# Patient Record
Sex: FEMALE | Race: WHITE | HISPANIC OR LATINO
[De-identification: names, ages, dates, MRNs, and addresses within clinical notes are randomized per-mention and may not be internally consistent; named-entity substitution may affect disease eponyms.]

---

## 2021-12-27 PROBLEM — Z00.00 ENCOUNTER FOR PREVENTIVE HEALTH EXAMINATION: Status: ACTIVE | Noted: 2021-12-27

## 2022-01-04 ENCOUNTER — APPOINTMENT (OUTPATIENT)
Dept: ENDOCRINOLOGY | Facility: CLINIC | Age: 62
End: 2022-01-04
Payer: COMMERCIAL

## 2022-01-04 ENCOUNTER — TRANSCRIPTION ENCOUNTER (OUTPATIENT)
Age: 62
End: 2022-01-04

## 2022-01-04 VITALS
WEIGHT: 137 LBS | RESPIRATION RATE: 16 BRPM | SYSTOLIC BLOOD PRESSURE: 120 MMHG | HEART RATE: 72 BPM | TEMPERATURE: 97.7 F | HEIGHT: 61 IN | DIASTOLIC BLOOD PRESSURE: 60 MMHG | BODY MASS INDEX: 25.86 KG/M2 | OXYGEN SATURATION: 98 %

## 2022-01-04 DIAGNOSIS — K21.9 GASTRO-ESOPHAGEAL REFLUX DISEASE W/OUT ESOPHAGITIS: ICD-10-CM

## 2022-01-04 PROCEDURE — 99244 OFF/OP CNSLTJ NEW/EST MOD 40: CPT | Mod: 25

## 2022-01-04 RX ORDER — TIOCONAZOLE 6.5 %
400 OINTMENT WITH PREFILLED APPLICATOR VAGINAL
Refills: 0 | Status: ACTIVE | COMMUNITY

## 2022-01-04 RX ORDER — FLUTICASONE PROPIONATE 50 MCG
50 SPRAY, SUSPENSION NASAL DAILY
Refills: 0 | Status: ACTIVE | COMMUNITY

## 2022-01-04 RX ORDER — ALBUTEROL 90 MCG
90 AEROSOL (GRAM) INHALATION
Refills: 0 | Status: ACTIVE | COMMUNITY

## 2022-01-04 RX ORDER — LACTOBACILLUS ACIDOPHILUS 0.5 MG
TABLET ORAL
Refills: 0 | Status: ACTIVE | COMMUNITY

## 2022-01-04 RX ORDER — ASCORBIC ACID 500 MG
500 TABLET ORAL DAILY
Refills: 0 | Status: ACTIVE | COMMUNITY

## 2022-01-04 RX ORDER — CYCLOSPORINE 0 G/ML
0.09 SOLUTION/ DROPS OPHTHALMIC; TOPICAL
Refills: 0 | Status: ACTIVE | COMMUNITY

## 2022-01-04 NOTE — CONSULT LETTER
[Dear  ___] : Dear  [unfilled], [Sincerely,] : Sincerely, [FreeTextEntry1] : Thank you for referring  Ms. Amanda Bourne to me for evaluation and treatment. Please, see attached consultation note. As always, if there are specific questions you would like to discuss, please feel free to contact me.\par Thank you for the courtesy of this evaluation.\par  [FreeTextEntry3] : Ligia Hernandez MD, FACE, ECNU\par

## 2022-01-04 NOTE — HISTORY OF PRESENT ILLNESS
[FreeTextEntry1] : 61 year female  referred for hypothyroidism  and thyroid cancer management. 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 She underwent a partial thyroidectomy, followed by a completion thyroidectomy and remnant POTTER ablation with 100 mci of I-131 in 1999. She believes that the cancer was contained. She's presently on Synthroid 88 mcg 5.5/ week and Cytomel 5 mcg qd (has been on for a long time). previously under care of Dr. Birch.\par Labs from 7/30/21- TSH- 0.16 <-- 0.03\par Tg- 0.1, Tg ab < 1\par Thyroid US (3/12/20)- no suspicious findings \par Thyroid US (1/19/18)- no suspicious findings \par Denies family history of thyroid cancer.\par She's been diagnosed with osteopenia in 2007, on Fosamax initially (with a GI distress), then switched to Boniva and stayed on it for 2 years.\par DXA (10/5/21)- LS (-1.1), FN (-1.4). FRAX- 8.2% and 0.7%\par DXA (8/6/19)- LS (-0.6), FN (-1.2)\par

## 2022-01-04 NOTE — ASSESSMENT
[FreeTextEntry1] : 1. FVPTC, s/p total thyroidectomy and remnant POTTER therapy\par - obtain full pathology report, as well as all info on WBS/POTTER therapy\par - at present, I do not think she requires a strict TSH suppression\par - we discussed a possible stopping of cytomel and readjusting her synthroid dose post labs\par - check thyroid panel, Tg/Tg antibodies\par - repeat thyroid bed US at [facility]\par \par 2. Osteopenia, still with a relatively low FRAX score\par - calcium 500 mg bid, add extra OTC vitamin D3 2,000 IU/day\par - continue weight-bearing exercises; advised avoiding high risk sports\par - DXA 3 sites in 10/23\par \par RTC 3-4 months\par

## 2022-01-07 LAB
25(OH)D3 SERPL-MCNC: 37.3 NG/ML
ALBUMIN SERPL ELPH-MCNC: 4.7 G/DL
ALP BLD-CCNC: 69 U/L
ALT SERPL-CCNC: 14 U/L
ANION GAP SERPL CALC-SCNC: 13 MMOL/L
AST SERPL-CCNC: 18 U/L
BILIRUB SERPL-MCNC: 0.7 MG/DL
BUN SERPL-MCNC: 18 MG/DL
CALCIUM SERPL-MCNC: 10.1 MG/DL
CHLORIDE SERPL-SCNC: 107 MMOL/L
CO2 SERPL-SCNC: 25 MMOL/L
CREAT SERPL-MCNC: 0.74 MG/DL
GLUCOSE SERPL-MCNC: 97 MG/DL
POTASSIUM SERPL-SCNC: 5 MMOL/L
PROT SERPL-MCNC: 6.6 G/DL
SODIUM SERPL-SCNC: 145 MMOL/L
T3 SERPL-MCNC: 125 NG/DL
T4 FREE SERPL-MCNC: 1.4 NG/DL
THYROGLOB AB SERPL-ACNC: <20 IU/ML
THYROGLOB SERPL-MCNC: <0.2 NG/ML
TSH SERPL-ACNC: 0.12 UIU/ML

## 2022-03-15 ENCOUNTER — RESULT REVIEW (OUTPATIENT)
Age: 62
End: 2022-03-15

## 2022-04-22 ENCOUNTER — APPOINTMENT (OUTPATIENT)
Dept: ENDOCRINOLOGY | Facility: CLINIC | Age: 62
End: 2022-04-22
Payer: COMMERCIAL

## 2022-04-22 VITALS
HEART RATE: 81 BPM | TEMPERATURE: 98.1 F | SYSTOLIC BLOOD PRESSURE: 127 MMHG | DIASTOLIC BLOOD PRESSURE: 78 MMHG | BODY MASS INDEX: 26.06 KG/M2 | HEIGHT: 61 IN | WEIGHT: 138 LBS | OXYGEN SATURATION: 98 %

## 2022-04-22 PROCEDURE — 99214 OFFICE O/P EST MOD 30 MIN: CPT | Mod: 25

## 2022-04-22 NOTE — HISTORY OF PRESENT ILLNESS
[FreeTextEntry1] : 62 year female  f/u for hypothyroidism  and thyroid cancer management. \par \par *** Apr 22, 2022 ***\par \par feels well on Synthroid 88 mcg 6/week. 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 She underwent a partial thyroidectomy, followed by a completion thyroidectomy and remnant POTTER ablation with 100 mci of I-131 in 1999. She believes that the cancer was contained. She's presently on Synthroid 88 mcg 5.5/ week and Cytomel 5 mcg qd (has been on for a long time). previously under care of Dr. Birch.\par Labs from 7/30/21- TSH- 0.16 <-- 0.03\par Tg- 0.1, Tg ab < 1\par Thyroid US (3/12/20)- no suspicious findings \par Thyroid US (1/19/18)- no suspicious findings \par Denies family history of thyroid cancer.\par She's been diagnosed with osteopenia in 2007, on Fosamax initially (with a GI distress), then switched to Boniva and stayed on it for 2 years.\par DXA (10/5/21)- LS (-1.1), FN (-1.4). FRAX- 8.2% and 0.7%\par DXA (8/6/19)- LS (-0.6), FN (-1.2)\par

## 2022-04-22 NOTE — ASSESSMENT
[FreeTextEntry1] : 1. FVPTC, s/p total thyroidectomy and remnant POTTER therapy\par - at present, I do not think she requires a strict TSH suppression\par - will keep off cytomel , continue Synthroid 88 mcg 6/week for now, pending results\par - monitor thyroid panel, Tg/Tg antibodies\par - repeat thyroid bed US at [facility] this month\par \par 2. Osteopenia, still with a relatively low FRAX score\par - calcium 500 mg bid, add extra OTC vitamin D3 2,000 IU/day\par - continue weight-bearing exercises; advised avoiding high risk sports\par - DXA 3 sites in 10/23\par \par RTC 3-6 months\par

## 2022-04-24 ENCOUNTER — TRANSCRIPTION ENCOUNTER (OUTPATIENT)
Age: 62
End: 2022-04-24

## 2022-04-24 LAB
T3FREE SERPL-MCNC: 2.63 PG/ML
T4 FREE SERPL-MCNC: 1.5 NG/DL
TSH SERPL-ACNC: 0.32 UIU/ML

## 2022-04-28 ENCOUNTER — TRANSCRIPTION ENCOUNTER (OUTPATIENT)
Age: 62
End: 2022-04-28

## 2022-05-16 ENCOUNTER — APPOINTMENT (OUTPATIENT)
Dept: ENDOCRINOLOGY | Facility: CLINIC | Age: 62
End: 2022-05-16

## 2022-10-24 ENCOUNTER — APPOINTMENT (OUTPATIENT)
Dept: ENDOCRINOLOGY | Facility: CLINIC | Age: 62
End: 2022-10-24

## 2022-10-24 VITALS
HEART RATE: 68 BPM | DIASTOLIC BLOOD PRESSURE: 66 MMHG | HEIGHT: 61 IN | TEMPERATURE: 98 F | BODY MASS INDEX: 25.3 KG/M2 | SYSTOLIC BLOOD PRESSURE: 118 MMHG | RESPIRATION RATE: 16 BRPM | WEIGHT: 134 LBS | OXYGEN SATURATION: 98 %

## 2022-10-24 PROCEDURE — 36415 COLL VENOUS BLD VENIPUNCTURE: CPT

## 2022-10-24 PROCEDURE — 99214 OFFICE O/P EST MOD 30 MIN: CPT | Mod: 25

## 2022-10-24 RX ORDER — LIOTHYRONINE SODIUM 5 UG/1
5 TABLET ORAL DAILY
Refills: 0 | Status: DISCONTINUED | COMMUNITY
End: 2022-10-24

## 2022-10-24 RX ORDER — GABAPENTIN 100 MG/1
100 CAPSULE ORAL 4 TIMES DAILY
Refills: 0 | Status: DISCONTINUED | COMMUNITY
End: 2022-10-24

## 2022-10-24 RX ORDER — TRAMADOL HYDROCHLORIDE 50 MG/1
50 TABLET, COATED ORAL
Qty: 28 | Refills: 0 | Status: DISCONTINUED | COMMUNITY
End: 2022-10-24

## 2022-10-24 NOTE — HISTORY OF PRESENT ILLNESS
[FreeTextEntry1] : 62 year female  f/u for hypothyroidism  and thyroid cancer management. \par \par *** Oct 24, 2022 ***\par \par feels well on Synthroid 88 mcg 6/week.\par denies new c/o, no palpitations\par Thyr US (4/25/22)- s/p total tx, no BRAYDEN\par \par *** Apr 22, 2022 ***\par \par feels well on Synthroid 88 mcg 6/week. 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 She underwent a partial thyroidectomy, followed by a completion thyroidectomy and remnant POTTER ablation with 100 mci of I-131 in 1999. She believes that the cancer was contained. She's presently on Synthroid 88 mcg 5.5/ week and Cytomel 5 mcg qd (has been on for a long time). previously under care of Dr. Birch.\par Labs from 7/30/21- TSH- 0.16 <-- 0.03\par Tg- 0.1, Tg ab < 1\par Thyroid US (3/12/20)- no suspicious findings \par Thyroid US (1/19/18)- no suspicious findings \par Denies family history of thyroid cancer.\par She's been diagnosed with osteopenia in 2007, on Fosamax initially (with a GI distress), then switched to Boniva and stayed on it for 2 years.\par DXA (10/5/21)- LS (-1.1), FN (-1.4). FRAX- 8.2% and 0.7%\par DXA (8/6/19)- LS (-0.6), FN (-1.2)\par

## 2022-10-24 NOTE — ASSESSMENT
[FreeTextEntry1] : 1. FVPTC, s/p total thyroidectomy and remnant POTTER therapy\par - at present, I do not think she requires a strict TSH suppression\par - will keep off cytomel , continue Synthroid 88 mcg 6/week for now, pending results\par - monitor thyroid panel, Tg/Tg antibodies\par - repeat thyroid bed US at [facility] in 4/25 (3 years)\par \par 2. Osteopenia, still with a relatively low FRAX score\par - calcium 500 mg bid, add extra OTC vitamin D3 2,000 IU/day\par - continue weight-bearing exercises; advised avoiding high risk sports\par - DXA 3 sites in 10/23\par \par RTC 6-12 months\par

## 2022-10-26 ENCOUNTER — TRANSCRIPTION ENCOUNTER (OUTPATIENT)
Age: 62
End: 2022-10-26

## 2022-10-26 LAB
25(OH)D3 SERPL-MCNC: 39 NG/ML
ALBUMIN SERPL ELPH-MCNC: 4.8 G/DL
ALP BLD-CCNC: 85 U/L
ALT SERPL-CCNC: 17 U/L
ANION GAP SERPL CALC-SCNC: 10 MMOL/L
AST SERPL-CCNC: 20 U/L
BILIRUB SERPL-MCNC: 0.7 MG/DL
BUN SERPL-MCNC: 20 MG/DL
CALCIUM SERPL-MCNC: 10.2 MG/DL
CHLORIDE SERPL-SCNC: 103 MMOL/L
CO2 SERPL-SCNC: 26 MMOL/L
CREAT SERPL-MCNC: 0.76 MG/DL
EGFR: 88 ML/MIN/1.73M2
GLUCOSE SERPL-MCNC: 92 MG/DL
POTASSIUM SERPL-SCNC: 5.4 MMOL/L
PROT SERPL-MCNC: 6.9 G/DL
SODIUM SERPL-SCNC: 139 MMOL/L
T4 FREE SERPL-MCNC: 1.5 NG/DL
THYROGLOB AB SERPL-ACNC: <20 IU/ML
THYROGLOB SERPL-MCNC: <0.2 NG/ML
TSH SERPL-ACNC: 0.5 UIU/ML

## 2022-11-08 ENCOUNTER — TRANSCRIPTION ENCOUNTER (OUTPATIENT)
Age: 62
End: 2022-11-08

## 2022-11-09 ENCOUNTER — TRANSCRIPTION ENCOUNTER (OUTPATIENT)
Age: 62
End: 2022-11-09

## 2023-03-08 ENCOUNTER — APPOINTMENT (OUTPATIENT)
Dept: ENDOCRINOLOGY | Facility: CLINIC | Age: 63
End: 2023-03-08
Payer: COMMERCIAL

## 2023-03-08 VITALS
OXYGEN SATURATION: 98 % | BODY MASS INDEX: 26.06 KG/M2 | SYSTOLIC BLOOD PRESSURE: 114 MMHG | HEIGHT: 61 IN | TEMPERATURE: 98 F | WEIGHT: 138 LBS | RESPIRATION RATE: 16 BRPM | HEART RATE: 72 BPM | DIASTOLIC BLOOD PRESSURE: 62 MMHG

## 2023-03-08 PROCEDURE — 99214 OFFICE O/P EST MOD 30 MIN: CPT | Mod: 25

## 2023-03-08 NOTE — HISTORY OF PRESENT ILLNESS
[FreeTextEntry1] : 62 year female  f/u for hypothyroidism  and thyroid cancer management. \par \par *** Mar 08, 2023 ***\par \par had covid in 12/22, still struggles with a post-covid fatigue\par was taking biotin for her nails, stopped a week ago for today's blood work\par on Synthroid 88 mcg 6/week.\par no recent labs\par \par *** Oct 24, 2022 ***\par \par feels well on Synthroid 88 mcg 6/week.\par denies new c/o, no palpitations\par Thyr US (4/25/22)- s/p total tx, no BRAYDEN\par \par *** Apr 22, 2022 ***\par \par feels well on Synthroid 88 mcg 6/week. 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 She underwent a partial thyroidectomy, followed by a completion thyroidectomy and remnant POTTER ablation with 100 mci of I-131 in 1999. She believes that the cancer was contained. She's presently on Synthroid 88 mcg 5.5/ week and Cytomel 5 mcg qd (has been on for a long time). previously under care of Dr. Birch.\par Labs from 7/30/21- TSH- 0.16 <-- 0.03\par Tg- 0.1, Tg ab < 1\par Thyroid US (3/12/20)- no suspicious findings \par Thyroid US (1/19/18)- no suspicious findings \par Denies family history of thyroid cancer.\par She's been diagnosed with osteopenia in 2007, on Fosamax initially (with a GI distress), then switched to Boniva and stayed on it for 2 years.\par DXA (10/5/21)- LS (-1.1), FN (-1.4). FRAX- 8.2% and 0.7%\par DXA (8/6/19)- LS (-0.6), FN (-1.2)\par

## 2023-03-08 NOTE — ASSESSMENT
[FreeTextEntry1] : 1. FVPTC, s/p total thyroidectomy and remnant POTTER therapy\par - at present, I do not think she requires a strict TSH suppression\par - will keep off cytomel , continue Synthroid 88 mcg 6/week for now, pending results\par - monitor thyroid panel, Tg/Tg antibodies\par - repeat thyroid bed US at [facility] in 4/25 (3 years)\par \par 2. Osteopenia, still with a relatively low FRAX score\par - calcium 500 mg bid, add extra OTC vitamin D3 2,000 IU/day\par - continue weight-bearing exercises; advised avoiding high risk sports\par - DXA 3 sites in 10/23\par \par RTC 6-12 months\par

## 2023-03-10 ENCOUNTER — TRANSCRIPTION ENCOUNTER (OUTPATIENT)
Age: 63
End: 2023-03-10

## 2023-03-10 LAB
25(OH)D3 SERPL-MCNC: 45.3 NG/ML
ALBUMIN SERPL ELPH-MCNC: 4.9 G/DL
ALP BLD-CCNC: 70 U/L
ALT SERPL-CCNC: 17 U/L
ANION GAP SERPL CALC-SCNC: 19 MMOL/L
AST SERPL-CCNC: 18 U/L
BILIRUB SERPL-MCNC: 0.4 MG/DL
BUN SERPL-MCNC: 23 MG/DL
CALCIUM SERPL-MCNC: 10.7 MG/DL
CHLORIDE SERPL-SCNC: 101 MMOL/L
CO2 SERPL-SCNC: 20 MMOL/L
CREAT SERPL-MCNC: 0.82 MG/DL
EGFR: 80 ML/MIN/1.73M2
GLUCOSE SERPL-MCNC: 93 MG/DL
POTASSIUM SERPL-SCNC: 5.1 MMOL/L
POTASSIUM SERPL-SCNC: 5.4 MMOL/L
PROT SERPL-MCNC: 7.2 G/DL
SODIUM SERPL-SCNC: 140 MMOL/L
T3FREE SERPL-MCNC: 2.96 PG/ML
T4 FREE SERPL-MCNC: 1.4 NG/DL
THYROGLOB AB SERPL-ACNC: <20 IU/ML
THYROGLOB SERPL-MCNC: <0.2 NG/ML
TSH SERPL-ACNC: 0.35 UIU/ML

## 2023-03-13 ENCOUNTER — TRANSCRIPTION ENCOUNTER (OUTPATIENT)
Age: 63
End: 2023-03-13

## 2023-03-22 ENCOUNTER — TRANSCRIPTION ENCOUNTER (OUTPATIENT)
Age: 63
End: 2023-03-22

## 2023-03-24 ENCOUNTER — TRANSCRIPTION ENCOUNTER (OUTPATIENT)
Age: 63
End: 2023-03-24

## 2023-08-23 ENCOUNTER — RX RENEWAL (OUTPATIENT)
Age: 63
End: 2023-08-23

## 2023-08-23 RX ORDER — LEVOTHYROXINE SODIUM 88 UG/1
88 TABLET ORAL DAILY
Qty: 90 | Refills: 3 | Status: ACTIVE | COMMUNITY
Start: 1900-01-01 | End: 1900-01-01

## 2023-11-06 ENCOUNTER — APPOINTMENT (OUTPATIENT)
Dept: ENDOCRINOLOGY | Facility: CLINIC | Age: 63
End: 2023-11-06
Payer: COMMERCIAL

## 2023-11-06 VITALS
BODY MASS INDEX: 26.71 KG/M2 | TEMPERATURE: 98.7 F | RESPIRATION RATE: 16 BRPM | SYSTOLIC BLOOD PRESSURE: 120 MMHG | DIASTOLIC BLOOD PRESSURE: 73 MMHG | WEIGHT: 141.44 LBS | OXYGEN SATURATION: 99 % | HEART RATE: 67 BPM | HEIGHT: 61 IN

## 2023-11-06 DIAGNOSIS — M85.80 OTHER SPECIFIED DISORDERS OF BONE DENSITY AND STRUCTURE, UNSPECIFIED SITE: ICD-10-CM

## 2023-11-06 PROCEDURE — 99214 OFFICE O/P EST MOD 30 MIN: CPT | Mod: 25

## 2023-11-06 PROCEDURE — 36415 COLL VENOUS BLD VENIPUNCTURE: CPT

## 2023-11-09 ENCOUNTER — TRANSCRIPTION ENCOUNTER (OUTPATIENT)
Age: 63
End: 2023-11-09

## 2023-11-09 LAB
25(OH)D3 SERPL-MCNC: 34.9 NG/ML
ALBUMIN SERPL ELPH-MCNC: 4.7 G/DL
ALP BLD-CCNC: 84 U/L
ALT SERPL-CCNC: 15 U/L
ANION GAP SERPL CALC-SCNC: 12 MMOL/L
AST SERPL-CCNC: 20 U/L
BASOPHILS # BLD AUTO: 0.07 K/UL
BASOPHILS NFR BLD AUTO: 0.9 %
BILIRUB SERPL-MCNC: 0.5 MG/DL
BUN SERPL-MCNC: 21 MG/DL
CALCIUM SERPL-MCNC: 9.6 MG/DL
CHLORIDE SERPL-SCNC: 105 MMOL/L
CO2 SERPL-SCNC: 23 MMOL/L
CREAT SERPL-MCNC: 0.62 MG/DL
EGFR: 99 ML/MIN/1.73M2
EOSINOPHIL # BLD AUTO: 0.33 K/UL
EOSINOPHIL NFR BLD AUTO: 4.2 %
ESTIMATED AVERAGE GLUCOSE: 97 MG/DL
GLUCOSE SERPL-MCNC: 84 MG/DL
HBA1C MFR BLD HPLC: 5 %
HCT VFR BLD CALC: 43.3 %
HGB BLD-MCNC: 14.3 G/DL
IMM GRANULOCYTES NFR BLD AUTO: 0.3 %
LYMPHOCYTES # BLD AUTO: 1.87 K/UL
LYMPHOCYTES NFR BLD AUTO: 23.7 %
MAN DIFF?: NORMAL
MCHC RBC-ENTMCNC: 30.4 PG
MCHC RBC-ENTMCNC: 33 GM/DL
MCV RBC AUTO: 92.1 FL
MONOCYTES # BLD AUTO: 0.45 K/UL
MONOCYTES NFR BLD AUTO: 5.7 %
NEUTROPHILS # BLD AUTO: 5.16 K/UL
NEUTROPHILS NFR BLD AUTO: 65.2 %
PLATELET # BLD AUTO: 240 K/UL
POTASSIUM SERPL-SCNC: 4.5 MMOL/L
PROT SERPL-MCNC: 6.6 G/DL
RBC # BLD: 4.7 M/UL
RBC # FLD: 13.9 %
SODIUM SERPL-SCNC: 140 MMOL/L
T3FREE SERPL-MCNC: 2.46 PG/ML
T4 FREE SERPL-MCNC: 1.6 NG/DL
THYROGLOB AB SERPL-ACNC: <1 IU/ML
THYROGLOB SERPL-MCNC: 0.1 NG/ML
TSH SERPL-ACNC: 0.52 UIU/ML
WBC # FLD AUTO: 7.9 K/UL

## 2024-04-12 ENCOUNTER — OUTPATIENT (OUTPATIENT)
Dept: OUTPATIENT SERVICES | Facility: HOSPITAL | Age: 64
LOS: 1 days | End: 2024-04-12
Payer: MEDICARE

## 2024-04-12 ENCOUNTER — TRANSCRIPTION ENCOUNTER (OUTPATIENT)
Age: 64
End: 2024-04-12

## 2024-04-12 ENCOUNTER — APPOINTMENT (OUTPATIENT)
Dept: RADIOLOGY | Facility: CLINIC | Age: 64
End: 2024-04-12
Payer: MEDICARE

## 2024-04-12 DIAGNOSIS — E89.0 POSTPROCEDURAL HYPOTHYROIDISM: ICD-10-CM

## 2024-04-12 PROCEDURE — 77080 DXA BONE DENSITY AXIAL: CPT

## 2024-04-12 PROCEDURE — 77080 DXA BONE DENSITY AXIAL: CPT | Mod: 26

## 2024-04-16 ENCOUNTER — APPOINTMENT (OUTPATIENT)
Dept: ENDOCRINOLOGY | Facility: CLINIC | Age: 64
End: 2024-04-16
Payer: MEDICARE

## 2024-04-16 VITALS
HEART RATE: 72 BPM | TEMPERATURE: 97.6 F | RESPIRATION RATE: 16 BRPM | SYSTOLIC BLOOD PRESSURE: 118 MMHG | HEIGHT: 61 IN | DIASTOLIC BLOOD PRESSURE: 70 MMHG | BODY MASS INDEX: 26.43 KG/M2 | OXYGEN SATURATION: 99 % | WEIGHT: 140 LBS

## 2024-04-16 DIAGNOSIS — C73 MALIGNANT NEOPLASM OF THYROID GLAND: ICD-10-CM

## 2024-04-16 DIAGNOSIS — E87.5 HYPERKALEMIA: ICD-10-CM

## 2024-04-16 DIAGNOSIS — E89.0 POSTPROCEDURAL HYPOTHYROIDISM: ICD-10-CM

## 2024-04-16 PROCEDURE — 99214 OFFICE O/P EST MOD 30 MIN: CPT

## 2024-04-16 PROCEDURE — 36415 COLL VENOUS BLD VENIPUNCTURE: CPT

## 2024-04-16 PROCEDURE — G2211 COMPLEX E/M VISIT ADD ON: CPT

## 2024-04-16 NOTE — ASSESSMENT
[FreeTextEntry1] : 1. FVPTC, s/p total thyroidectomy and remnant POTTER therapy - at present, I do not think she requires a strict TSH suppression - will keep off cytomel , continue Synthroid 88 mcg 6/week for now, pending results - monitor thyroid panel, Tg/Tg antibodies - repeat thyroid bed US at [facility] in 4/25 (3 years)  2. Siet specific osteoporosis I advised the patient on antiresorptive therapy, and reviewed potential options for osteoporosis treatment, including oral and IV bisphosphonates, Prolia, Evenity and rh-PTH therapy. R+B of each options were discussed in detail - calcium 500 mg bid, extra OTC vitamin D3 2,000 IU/day - labs today- will screen for secondary causes - weight-bearing exercises; advised avoiding high risk sports - dental evaluation advised. - potentially will plan for anabolic tx (possibly Evenity), followed by a maintenance tx with b/phos or prolia. Patient will also research, GEMMA carrolliven

## 2024-04-16 NOTE — HISTORY OF PRESENT ILLNESS
[FreeTextEntry1] : 64 year female  f/u for hypothyroidism  and thyroid cancer management.   *** Apr 16, 2024 ***  returned earlier for DXA reviewed no new c/o staying on Synthroid 88 mcg 6/week, calcium 600 mg qd, OTC vitamin D no falls/ fractures  DXA(4/12/24) - LS (-1.0), FN (-1.9), radius (-3.0) DXA (10/5/21)- LS (-1.1), FN (-1.4). FRAX- 8.2% and 0.7% DXA (8/6/19)- LS (-0.6), FN (-1.2)  *** Nov 06, 2023 ***  doing well overall, except for chronic troubles with sleeping on Synthroid 88 mcg 6/week.   *** Mar 08, 2023 ***  had covid in 12/22, still struggles with a post-covid fatigue was taking biotin for her nails, stopped a week ago for today's blood work on Synthroid 88 mcg 6/week. no recent labs  *** Oct 24, 2022 ***  feels well on Synthroid 88 mcg 6/week. denies new c/o, no palpitations Thyr US (4/25/22)- s/p total tx, no BRAYDEN  *** Apr 22, 2022 ***  feels well on Synthroid 88 mcg 6/week. Off cytomel Patient brought more old records. 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 Left completion thyroidectomy (1/6/1999)-pathology-no evidence of malignancy Thyrogen stimulated whole-body scan (5/25/2001 stimulated thyroglobulin less than 1.0, thyroglobulin antibodies-negative.   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 She underwent a partial thyroidectomy, followed by a completion thyroidectomy and remnant POTTER ablation with 100 mci of I-131 in 1999. She believes that the cancer was contained. She's presently on Synthroid 88 mcg 5.5/ week and Cytomel 5 mcg qd (has been on for a long time). previously under care of Dr. Birch. Labs from 7/30/21- TSH- 0.16 <-- 0.03 Tg- 0.1, Tg ab < 1 Thyroid US (3/12/20)- no suspicious findings  Thyroid US (1/19/18)- no suspicious findings  Denies family history of thyroid cancer. She's been diagnosed with osteopenia in 2007, on Fosamax initially (with a GI distress), then switched to Boniva and stayed on it for 2 years. DXA (10/5/21)- LS (-1.1), FN (-1.4). FRAX- 8.2% and 0.7% DXA (8/6/19)- LS (-0.6), FN (-1.2)

## 2024-04-19 ENCOUNTER — TRANSCRIPTION ENCOUNTER (OUTPATIENT)
Age: 64
End: 2024-04-19

## 2024-04-19 LAB
25(OH)D3 SERPL-MCNC: 40.7 NG/ML
ALBUMIN MFR SERPL ELPH: 64.6 %
ALBUMIN SERPL ELPH-MCNC: 4.7 G/DL
ALBUMIN SERPL-MCNC: 4.3 G/DL
ALBUMIN/GLOB SERPL: 1.9 RATIO
ALP BLD-CCNC: 89 U/L
ALP BONE SERPL-MCNC: 17.7 UG/L
ALPHA1 GLOB MFR SERPL ELPH: 3.9 %
ALPHA1 GLOB SERPL ELPH-MCNC: 0.3 G/DL
ALPHA2 GLOB MFR SERPL ELPH: 11.5 %
ALPHA2 GLOB SERPL ELPH-MCNC: 0.8 G/DL
ALT SERPL-CCNC: 16 U/L
ANION GAP SERPL CALC-SCNC: 10 MMOL/L
AST SERPL-CCNC: 20 U/L
B-GLOBULIN MFR SERPL ELPH: 9.9 %
B-GLOBULIN SERPL ELPH-MCNC: 0.7 G/DL
BILIRUB SERPL-MCNC: 0.4 MG/DL
BUN SERPL-MCNC: 23 MG/DL
CALCIUM SERPL-MCNC: 10 MG/DL
CALCIUM SERPL-MCNC: 10 MG/DL
CHLORIDE SERPL-SCNC: 106 MMOL/L
CO2 SERPL-SCNC: 27 MMOL/L
CREAT SERPL-MCNC: 0.75 MG/DL
DEPRECATED KAPPA LC FREE/LAMBDA SER: 1.31 RATIO
EGFR: 88 ML/MIN/1.73M2
GAMMA GLOB FLD ELPH-MCNC: 0.7 G/DL
GAMMA GLOB MFR SERPL ELPH: 10.1 %
GLUCOSE SERPL-MCNC: 90 MG/DL
INTERPRETATION SERPL IEP-IMP: NORMAL
KAPPA LC CSF-MCNC: 1.12 MG/DL
KAPPA LC SERPL-MCNC: 1.47 MG/DL
M PROTEIN SPEC IFE-MCNC: NORMAL
OSTEOCALCIN SERPL-MCNC: 16.1 NG/ML
PARATHYROID HORMONE INTACT: 31 PG/ML
PHOSPHATE SERPL-MCNC: 4.2 MG/DL
POTASSIUM SERPL-SCNC: 5.4 MMOL/L
PROT SERPL-MCNC: 6.6 G/DL
SODIUM SERPL-SCNC: 142 MMOL/L
T3 SERPL-MCNC: 71 NG/DL
T4 FREE SERPL-MCNC: 1.3 NG/DL
TSH SERPL-ACNC: 0.79 UIU/ML

## 2024-04-21 LAB
THYROGLOB AB SERPL-ACNC: <1 IU/ML
THYROGLOB SERPL-MCNC: <0.1 NG/ML

## 2024-04-30 ENCOUNTER — TRANSCRIPTION ENCOUNTER (OUTPATIENT)
Age: 64
End: 2024-04-30

## 2024-05-20 ENCOUNTER — APPOINTMENT (OUTPATIENT)
Dept: ENDOCRINOLOGY | Facility: CLINIC | Age: 64
End: 2024-05-20
Payer: MEDICARE

## 2024-05-20 VITALS
RESPIRATION RATE: 16 BRPM | BODY MASS INDEX: 26.81 KG/M2 | OXYGEN SATURATION: 96 % | SYSTOLIC BLOOD PRESSURE: 122 MMHG | HEART RATE: 61 BPM | WEIGHT: 142 LBS | DIASTOLIC BLOOD PRESSURE: 70 MMHG | HEIGHT: 61 IN

## 2024-05-20 PROCEDURE — 99212 OFFICE O/P EST SF 10 MIN: CPT | Mod: 25

## 2024-05-20 PROCEDURE — 96372 THER/PROPH/DIAG INJ SC/IM: CPT

## 2024-05-20 RX ORDER — ROMOSOZUMAB-AQQG 105 MG/1.17ML
105 INJECTION, SOLUTION SUBCUTANEOUS
Refills: 0 | Status: COMPLETED | OUTPATIENT
Start: 2024-05-20

## 2024-05-20 RX ADMIN — ROMOSOZUMAB-AQQG 0 MG/1.17ML: 105 INJECTION, SOLUTION SUBCUTANEOUS at 00:00

## 2024-05-20 NOTE — ASSESSMENT
[FreeTextEntry1] : 1. FVPTC, s/p total thyroidectomy and remnant POTTER therapy - at present, I do not think she requires a strict TSH suppression - will keep off cytomel , continue Synthroid 88 mcg 6/week for now, pending results - monitor thyroid panel, Tg/Tg antibodies - repeat thyroid bed US at [facility] in 4/25 (3 years)  2. Siet specific osteoporosis I advised the patient on antiresorptive therapy, and reviewed potential options for osteoporosis treatment, including oral and IV bisphosphonates, Prolia, Evenity and rh-PTH therapy. R+B of each options were discussed in detail - calcium 500 mg bid, extra OTC vitamin D3 2,000 IU/day - labs today- will screen for secondary causes - weight-bearing exercises; advised avoiding high risk sports - dental evaluation advised. - Evenity # 1 today, followed by a maintenance tx with b/phos or prolia.  RTC 1 month for Evenity injection

## 2024-05-20 NOTE — REASON FOR VISIT
[Hypothyroidism] : hypothyroidism [Thyroid Cancer] : thyroid cancer [Spouse] : spouse [Osteoporosis] : osteoporosis

## 2024-05-20 NOTE — HISTORY OF PRESENT ILLNESS
[FreeTextEntry1] : 64 year female  f/u for hypothyroidism  and thyroid cancer management.   *** May 20, 2024 ***  Patient returned for Evenity injection. Once again, risks and benefits were reviewed in details. REMS provided.  *** Prolia 60 mg/ml Lot # [id number] Exp. 06/30/26   *** Apr 16, 2024 ***  returned earlier for DXA reviewed no new c/o staying on Synthroid 88 mcg 6/week, calcium 600 mg qd, OTC vitamin D no falls/ fractures  DXA(4/12/24) - LS (-1.0), FN (-1.9), radius (-3.0) DXA (10/5/21)- LS (-1.1), FN (-1.4). FRAX- 8.2% and 0.7% DXA (8/6/19)- LS (-0.6), FN (-1.2)  *** Nov 06, 2023 ***  doing well overall, except for chronic troubles with sleeping on Synthroid 88 mcg 6/week.   *** Mar 08, 2023 ***  had covid in 12/22, still struggles with a post-covid fatigue was taking biotin for her nails, stopped a week ago for today's blood work on Synthroid 88 mcg 6/week. no recent labs  *** Oct 24, 2022 ***  feels well on Synthroid 88 mcg 6/week. denies new c/o, no palpitations Thyr US (4/25/22)- s/p total tx, no BRAYDEN  *** Apr 22, 2022 ***  feels well on Synthroid 88 mcg 6/week. Off cytomel Patient brought more old records. 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 Left completion thyroidectomy (1/6/1999)-pathology-no evidence of malignancy Thyrogen stimulated whole-body scan (5/25/2001 stimulated thyroglobulin less than 1.0, thyroglobulin antibodies-negative.   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 She underwent a partial thyroidectomy, followed by a completion thyroidectomy and remnant POTTER ablation with 100 mci of I-131 in 1999. She believes that the cancer was contained. She's presently on Synthroid 88 mcg 5.5/ week and Cytomel 5 mcg qd (has been on for a long time). previously under care of Dr. Birch. Labs from 7/30/21- TSH- 0.16 <-- 0.03 Tg- 0.1, Tg ab < 1 Thyroid US (3/12/20)- no suspicious findings  Thyroid US (1/19/18)- no suspicious findings  Denies family history of thyroid cancer. She's been diagnosed with osteopenia in 2007, on Fosamax initially (with a GI distress), then switched to Boniva and stayed on it for 2 years. DXA (10/5/21)- LS (-1.1), FN (-1.4). FRAX- 8.2% and 0.7% DXA (8/6/19)- LS (-0.6), FN (-1.2)

## 2024-06-18 ENCOUNTER — APPOINTMENT (OUTPATIENT)
Dept: ENDOCRINOLOGY | Facility: CLINIC | Age: 64
End: 2024-06-18
Payer: MEDICARE

## 2024-06-18 VITALS
HEART RATE: 63 BPM | OXYGEN SATURATION: 96 % | SYSTOLIC BLOOD PRESSURE: 124 MMHG | HEIGHT: 61 IN | WEIGHT: 138 LBS | BODY MASS INDEX: 26.06 KG/M2 | RESPIRATION RATE: 16 BRPM | TEMPERATURE: 97.7 F | DIASTOLIC BLOOD PRESSURE: 72 MMHG

## 2024-06-18 DIAGNOSIS — M81.0 AGE-RELATED OSTEOPOROSIS W/OUT CURRENT PATHOLOGICAL FRACTURE: ICD-10-CM

## 2024-06-18 PROCEDURE — 96372 THER/PROPH/DIAG INJ SC/IM: CPT

## 2024-06-18 PROCEDURE — 99212 OFFICE O/P EST SF 10 MIN: CPT | Mod: 25

## 2024-06-18 RX ORDER — ROMOSOZUMAB-AQQG 105 MG/1.17ML
105 INJECTION, SOLUTION SUBCUTANEOUS
Refills: 0 | Status: COMPLETED | OUTPATIENT
Start: 2024-06-18

## 2024-06-18 RX ADMIN — ROMOSOZUMAB-AQQG 0 MG/1.17ML: 105 INJECTION, SOLUTION SUBCUTANEOUS at 00:00

## 2024-06-18 NOTE — HISTORY OF PRESENT ILLNESS
[FreeTextEntry1] : 64 year female  f/u for hypothyroidism  and thyroid cancer management.   *** Jun 18, 2024 ***  Patient returned for Evenity injection # 2. tolerated well, except for a brief headache the day of Once again, risks and benefits were reviewed in details. REMS provided.  *** Prolia 60 mg/ml Lot # [id number] Exp. 10/31/26   *** May 20, 2024 ***  Patient returned for Evenity injection. Once again, risks and benefits were reviewed in details. REMS provided.  *** Prolia 60 mg/ml Lot # [id number] Exp. 06/30/26   *** Apr 16, 2024 ***  returned earlier for DXA reviewed no new c/o staying on Synthroid 88 mcg 6/week, calcium 600 mg qd, OTC vitamin D no falls/ fractures  DXA(4/12/24) - LS (-1.0), FN (-1.9), radius (-3.0) DXA (10/5/21)- LS (-1.1), FN (-1.4). FRAX- 8.2% and 0.7% DXA (8/6/19)- LS (-0.6), FN (-1.2)  *** Nov 06, 2023 ***  doing well overall, except for chronic troubles with sleeping on Synthroid 88 mcg 6/week.   *** Mar 08, 2023 ***  had covid in 12/22, still struggles with a post-covid fatigue was taking biotin for her nails, stopped a week ago for today's blood work on Synthroid 88 mcg 6/week. no recent labs  *** Oct 24, 2022 ***  feels well on Synthroid 88 mcg 6/week. denies new c/o, no palpitations Thyr US (4/25/22)- s/p total tx, no BRAYDEN  *** Apr 22, 2022 ***  feels well on Synthroid 88 mcg 6/week. Off cytomel Patient brought more old records. 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 Left completion thyroidectomy (1/6/1999)-pathology-no evidence of malignancy Thyrogen stimulated whole-body scan (5/25/2001 stimulated thyroglobulin less than 1.0, thyroglobulin antibodies-negative.   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 She underwent a partial thyroidectomy, followed by a completion thyroidectomy and remnant POTTER ablation with 100 mci of I-131 in 1999. She believes that the cancer was contained. She's presently on Synthroid 88 mcg 5.5/ week and Cytomel 5 mcg qd (has been on for a long time). previously under care of Dr. Birch. Labs from 7/30/21- TSH- 0.16 <-- 0.03 Tg- 0.1, Tg ab < 1 Thyroid US (3/12/20)- no suspicious findings  Thyroid US (1/19/18)- no suspicious findings  Denies family history of thyroid cancer. She's been diagnosed with osteopenia in 2007, on Fosamax initially (with a GI distress), then switched to Boniva and stayed on it for 2 years. DXA (10/5/21)- LS (-1.1), FN (-1.4). FRAX- 8.2% and 0.7% DXA (8/6/19)- LS (-0.6), FN (-1.2)

## 2024-06-18 NOTE — ASSESSMENT
[FreeTextEntry1] : 1. FVPTC, s/p total thyroidectomy and remnant POTTER therapy - at present, I do not think she requires a strict TSH suppression - will keep off cytomel , continue Synthroid 88 mcg 6/week for now, pending results - monitor thyroid panel, Tg/Tg antibodies - repeat thyroid bed US at [facility] in 4/25 (3 years)  2. Siet specific osteoporosis I advised the patient on antiresorptive therapy, and reviewed potential options for osteoporosis treatment, including oral and IV bisphosphonates, Prolia, Evenity and rh-PTH therapy. R+B of each options were discussed in detail - calcium 500 mg bid, extra OTC vitamin D3 2,000 IU/day - weight-bearing exercises; advised avoiding high risk sports - Evenity , followed by a maintenance tx with b/phos or prolia.  - hydration , magnesium on the day of injection RTC 1 month for Evenity injection # 3, labs next visit

## 2024-06-18 NOTE — REASON FOR VISIT
[Hypothyroidism] : hypothyroidism [Osteoporosis] : osteoporosis [Thyroid Cancer] : thyroid cancer [Spouse] : spouse

## 2024-07-05 ENCOUNTER — TRANSCRIPTION ENCOUNTER (OUTPATIENT)
Age: 64
End: 2024-07-05

## 2024-07-08 ENCOUNTER — TRANSCRIPTION ENCOUNTER (OUTPATIENT)
Age: 64
End: 2024-07-08

## 2024-07-18 ENCOUNTER — APPOINTMENT (OUTPATIENT)
Dept: ENDOCRINOLOGY | Facility: CLINIC | Age: 64
End: 2024-07-18
Payer: MEDICARE

## 2024-07-18 VITALS
DIASTOLIC BLOOD PRESSURE: 67 MMHG | BODY MASS INDEX: 26.62 KG/M2 | HEIGHT: 61 IN | RESPIRATION RATE: 16 BRPM | HEART RATE: 70 BPM | OXYGEN SATURATION: 99 % | WEIGHT: 141 LBS | SYSTOLIC BLOOD PRESSURE: 101 MMHG | TEMPERATURE: 97.6 F

## 2024-07-18 DIAGNOSIS — M81.0 AGE-RELATED OSTEOPOROSIS W/OUT CURRENT PATHOLOGICAL FRACTURE: ICD-10-CM

## 2024-07-18 DIAGNOSIS — C73 MALIGNANT NEOPLASM OF THYROID GLAND: ICD-10-CM

## 2024-07-18 PROCEDURE — 99212 OFFICE O/P EST SF 10 MIN: CPT | Mod: 25

## 2024-07-18 PROCEDURE — 96372 THER/PROPH/DIAG INJ SC/IM: CPT

## 2024-07-18 RX ORDER — ROMOSOZUMAB-AQQG 105 MG/1.17ML
105 INJECTION, SOLUTION SUBCUTANEOUS
Refills: 0 | Status: COMPLETED | OUTPATIENT
Start: 2024-07-18

## 2024-07-18 RX ADMIN — ROMOSOZUMAB-AQQG 0 MG/1.17ML: 105 INJECTION, SOLUTION SUBCUTANEOUS at 00:00

## 2024-08-15 ENCOUNTER — APPOINTMENT (OUTPATIENT)
Dept: ENDOCRINOLOGY | Facility: CLINIC | Age: 64
End: 2024-08-15
Payer: MEDICARE

## 2024-08-15 VITALS
WEIGHT: 140 LBS | BODY MASS INDEX: 26.43 KG/M2 | SYSTOLIC BLOOD PRESSURE: 119 MMHG | OXYGEN SATURATION: 98 % | HEIGHT: 61 IN | RESPIRATION RATE: 16 BRPM | TEMPERATURE: 98.7 F | DIASTOLIC BLOOD PRESSURE: 77 MMHG | HEART RATE: 77 BPM

## 2024-08-15 DIAGNOSIS — E89.0 POSTPROCEDURAL HYPOTHYROIDISM: ICD-10-CM

## 2024-08-15 DIAGNOSIS — C73 MALIGNANT NEOPLASM OF THYROID GLAND: ICD-10-CM

## 2024-08-15 DIAGNOSIS — M81.0 AGE-RELATED OSTEOPOROSIS W/OUT CURRENT PATHOLOGICAL FRACTURE: ICD-10-CM

## 2024-08-15 DIAGNOSIS — E87.5 HYPERKALEMIA: ICD-10-CM

## 2024-08-15 PROCEDURE — 96372 THER/PROPH/DIAG INJ SC/IM: CPT

## 2024-08-15 PROCEDURE — ZZZZZ: CPT

## 2024-08-15 PROCEDURE — 36415 COLL VENOUS BLD VENIPUNCTURE: CPT

## 2024-08-15 PROCEDURE — 99214 OFFICE O/P EST MOD 30 MIN: CPT | Mod: 25

## 2024-08-15 RX ORDER — ROMOSOZUMAB-AQQG 105 MG/1.17ML
105 INJECTION, SOLUTION SUBCUTANEOUS
Refills: 0 | Status: COMPLETED | OUTPATIENT
Start: 2024-08-15

## 2024-08-15 RX ADMIN — ROMOSOZUMAB-AQQG 0 MG/1.17ML: 105 INJECTION, SOLUTION SUBCUTANEOUS at 00:00

## 2024-08-15 NOTE — ASSESSMENT
[FreeTextEntry1] : 1. FVPTC, s/p total thyroidectomy and remnant POTTER therapy - at present, I do not think she requires a strict TSH suppression - will keep off cytomel , continue Synthroid 88 mcg 6/week for now - monitor thyroid panel, Tg/Tg antibodies - repeat thyroid bed US at [facility] in 4/25 (3 years)  2. Siet specific osteoporosis I advised the patient on antiresorptive therapy, and reviewed potential options for osteoporosis treatment, including oral and IV bisphosphonates, Prolia, Evenity and rh-PTH therapy. R+B of each options were discussed in detail - calcium 500 mg bid, extra OTC vitamin D3 2,000 IU/day - weight-bearing exercises; advised avoiding high risk sports - Evenity , followed by a maintenance tx with b/phos or prolia.  - hydration , magnesium on the day of injection - apply compresses after the injection RTC 1 month for Evenity injection # 5

## 2024-08-15 NOTE — HISTORY OF PRESENT ILLNESS
[FreeTextEntry1] : 64 year female  f/u for hypothyroidism  and thyroid cancer management.   *** Aug 15, 2024 ***  Patient returned for Evenity injection # 4. continues to take Magnesium post injection which helps a lot. overall feels fine off biotin > 1 week Once again, risks and benefits were reviewed in details. REMS provided.  *** Prolia 60 mg/ml Lot # [id number] Exp. 12/31/26  *** Jul 18, 2024 ***  Patient returned for Evenity injection # 3. took magnesium after the last injection and had no headaches felt some induration post injection sites, resolved 2 days after took biotin today Once again, risks and benefits were reviewed in details. REMS provided.  *** Prolia 60 mg/ml Lot # [id number] Exp. 10/31/26  *** Jun 18, 2024 ***  Patient returned for Evenity injection # 2. tolerated well, except for a brief headache the day of Once again, risks and benefits were reviewed in details. REMS provided.  *** Prolia 60 mg/ml Lot # [id number] Exp. 10/31/26   *** May 20, 2024 ***  Patient returned for Evenity injection. Once again, risks and benefits were reviewed in details. REMS provided.  *** Prolia 60 mg/ml Lot # [id number] Exp. 06/30/26   *** Apr 16, 2024 ***  returned earlier for DXA reviewed no new c/o staying on Synthroid 88 mcg 6/week, calcium 600 mg qd, OTC vitamin D no falls/ fractures  DXA(4/12/24) - LS (-1.0), FN (-1.9), radius (-3.0) DXA (10/5/21)- LS (-1.1), FN (-1.4). FRAX- 8.2% and 0.7% DXA (8/6/19)- LS (-0.6), FN (-1.2)  *** Nov 06, 2023 ***  doing well overall, except for chronic troubles with sleeping on Synthroid 88 mcg 6/week.   *** Mar 08, 2023 ***  had covid in 12/22, still struggles with a post-covid fatigue was taking biotin for her nails, stopped a week ago for today's blood work on Synthroid 88 mcg 6/week. no recent labs  *** Oct 24, 2022 ***  feels well on Synthroid 88 mcg 6/week. denies new c/o, no palpitations Thyr US (4/25/22)- s/p total tx, no BRAYDEN  *** Apr 22, 2022 ***  feels well on Synthroid 88 mcg 6/week. Off cytomel Patient brought more old records. 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 Left completion thyroidectomy (1/6/1999)-pathology-no evidence of malignancy Thyrogen stimulated whole-body scan (5/25/2001 stimulated thyroglobulin less than 1.0, thyroglobulin antibodies-negative.   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 She underwent a partial thyroidectomy, followed by a completion thyroidectomy and remnant POTTER ablation with 100 mci of I-131 in 1999. She believes that the cancer was contained. She's presently on Synthroid 88 mcg 5.5/ week and Cytomel 5 mcg qd (has been on for a long time). previously under care of Dr. Birch. Labs from 7/30/21- TSH- 0.16 <-- 0.03 Tg- 0.1, Tg ab < 1 Thyroid US (3/12/20)- no suspicious findings  Thyroid US (1/19/18)- no suspicious findings  Denies family history of thyroid cancer. She's been diagnosed with osteopenia in 2007, on Fosamax initially (with a GI distress), then switched to Boniva and stayed on it for 2 years. DXA (10/5/21)- LS (-1.1), FN (-1.4). FRAX- 8.2% and 0.7% DXA (8/6/19)- LS (-0.6), FN (-1.2)

## 2024-08-15 NOTE — REASON FOR VISIT
[Hypothyroidism] : hypothyroidism [Osteoporosis] : osteoporosis [Thyroid Cancer] : thyroid cancer [Spouse] : spouse [Follow - Up] : a follow-up visit

## 2024-08-16 ENCOUNTER — TRANSCRIPTION ENCOUNTER (OUTPATIENT)
Age: 64
End: 2024-08-16

## 2024-08-16 LAB
25(OH)D3 SERPL-MCNC: 32.5 NG/ML
ALBUMIN SERPL ELPH-MCNC: 4.5 G/DL
ALP BLD-CCNC: 104 U/L
ALT SERPL-CCNC: 14 U/L
ANION GAP SERPL CALC-SCNC: 17 MMOL/L
AST SERPL-CCNC: 16 U/L
BILIRUB SERPL-MCNC: 0.7 MG/DL
BUN SERPL-MCNC: 22 MG/DL
CALCIUM SERPL-MCNC: 9.6 MG/DL
CHLORIDE SERPL-SCNC: 104 MMOL/L
CO2 SERPL-SCNC: 24 MMOL/L
CREAT SERPL-MCNC: 0.75 MG/DL
EGFR: 88 ML/MIN/1.73M2
ESTIMATED AVERAGE GLUCOSE: 100 MG/DL
GLUCOSE SERPL-MCNC: 42 MG/DL
HBA1C MFR BLD HPLC: 5.1 %
MAGNESIUM SERPL-MCNC: 2.3 MG/DL
POTASSIUM SERPL-SCNC: 5.2 MMOL/L
POTASSIUM SERPL-SCNC: 5.7 MMOL/L
PROT SERPL-MCNC: 6.5 G/DL
SODIUM SERPL-SCNC: 145 MMOL/L
T3 SERPL-MCNC: 81 NG/DL
T4 FREE SERPL-MCNC: 1.4 NG/DL
THYROGLOB AB SERPL-ACNC: <20 IU/ML
THYROGLOB SERPL-MCNC: <0.2 NG/ML
TSH SERPL-ACNC: 1.14 UIU/ML

## 2024-09-04 ENCOUNTER — RX RENEWAL (OUTPATIENT)
Age: 64
End: 2024-09-04

## 2024-09-09 ENCOUNTER — APPOINTMENT (OUTPATIENT)
Dept: ENDOCRINOLOGY | Facility: CLINIC | Age: 64
End: 2024-09-09
Payer: MEDICARE

## 2024-09-09 VITALS
TEMPERATURE: 98.2 F | OXYGEN SATURATION: 97 % | SYSTOLIC BLOOD PRESSURE: 125 MMHG | RESPIRATION RATE: 16 BRPM | BODY MASS INDEX: 27 KG/M2 | DIASTOLIC BLOOD PRESSURE: 75 MMHG | HEART RATE: 65 BPM | HEIGHT: 61 IN | WEIGHT: 143 LBS

## 2024-09-09 DIAGNOSIS — M81.0 AGE-RELATED OSTEOPOROSIS W/OUT CURRENT PATHOLOGICAL FRACTURE: ICD-10-CM

## 2024-09-09 PROCEDURE — 96372 THER/PROPH/DIAG INJ SC/IM: CPT

## 2024-09-10 NOTE — REASON FOR VISIT
[Follow - Up] : a follow-up visit [Hypothyroidism] : hypothyroidism [Osteoporosis] : osteoporosis [Thyroid Cancer] : thyroid cancer [Spouse] : spouse

## 2024-09-10 NOTE — ASSESSMENT
[FreeTextEntry1] : Evenity Injection.  Instructions per  Dr. Hernandez reiterated and carried out as documented below.     1: Siet specific osteoporosis I advised the patient on antiresorptive therapy, and reviewed potential options for osteoporosis treatment, including oral and IV bisphosphonates, Prolia, Evenity and rh-PTH therapy. R+B of each options were discussed in detail - calcium 500 mg bid, extra OTC vitamin D3 2,000 IU/day - weight-bearing exercises; advised avoiding high risk sports - Evenity , followed by a maintenance tx with b/phos or prolia.  - hydration , magnesium on the day of injection - apply compresses after the injection  RTC 1 month for Evenity injection # 6  36.8

## 2024-09-10 NOTE — HISTORY OF PRESENT ILLNESS
[FreeTextEntry1] : 64 year female  f/u for evenity injection  ***Sept. 10, 2024*** Patient returned for Evenity injection # 5 continues to take Magnesium post injection which helps a lot. overall feels fine off biotin > 1 month Questions and concerns were answered in detail. REMS provided. Romosozumab injection administered 105mg /1.17 ml x 2.   Lot # [id number] Exp: [id number]  *** Aug 15, 2024 ***  Patient returned for Evenity injection # 4. continues to take Magnesium post injection which helps a lot. overall feels fine off biotin > 1 week Once again, risks and benefits were reviewed in details. REMS provided.  *** Prolia 60 mg/ml Lot # [id number] Exp. 12/31/26  *** Jul 18, 2024 ***  Patient returned for Evenity injection # 3. took magnesium after the last injection and had no headaches felt some induration post injection sites, resolved 2 days after took biotin today Once again, risks and benefits were reviewed in details. REMS provided.  *** Prolia 60 mg/ml Lot # [id number] Exp. 10/31/26  *** Jun 18, 2024 ***  Patient returned for Evenity injection # 2. tolerated well, except for a brief headache the day of Once again, risks and benefits were reviewed in details. REMS provided.  *** Prolia 60 mg/ml Lot # [id number] Exp. 10/31/26   *** May 20, 2024 ***  Patient returned for Evenity injection. Once again, risks and benefits were reviewed in details. REMS provided.  *** Prolia 60 mg/ml Lot # [id number] Exp. 06/30/26   *** Apr 16, 2024 ***  returned earlier for DXA reviewed no new c/o staying on Synthroid 88 mcg 6/week, calcium 600 mg qd, OTC vitamin D no falls/ fractures  DXA(4/12/24) - LS (-1.0), FN (-1.9), radius (-3.0) DXA (10/5/21)- LS (-1.1), FN (-1.4). FRAX- 8.2% and 0.7% DXA (8/6/19)- LS (-0.6), FN (-1.2)  *** Nov 06, 2023 ***  doing well overall, except for chronic troubles with sleeping on Synthroid 88 mcg 6/week.   *** Mar 08, 2023 ***  had covid in 12/22, still struggles with a post-covid fatigue was taking biotin for her nails, stopped a week ago for today's blood work on Synthroid 88 mcg 6/week. no recent labs  *** Oct 24, 2022 ***  feels well on Synthroid 88 mcg 6/week. denies new c/o, no palpitations Thyr US (4/25/22)- s/p total tx, no BRAYDEN  *** Apr 22, 2022 ***  feels well on Synthroid 88 mcg 6/week. Off cytomel Patient brought more old records. 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 Left completion thyroidectomy (1/6/1999)-pathology-no evidence of malignancy Thyrogen stimulated whole-body scan (5/25/2001 stimulated thyroglobulin less than 1.0, thyroglobulin antibodies-negative.   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 She underwent a partial thyroidectomy, followed by a completion thyroidectomy and remnant POTTER ablation with 100 mci of I-131 in 1999. She believes that the cancer was contained. She's presently on Synthroid 88 mcg 5.5/ week and Cytomel 5 mcg qd (has been on for a long time). previously under care of Dr. Birch. Labs from 7/30/21- TSH- 0.16 <-- 0.03 Tg- 0.1, Tg ab < 1 Thyroid US (3/12/20)- no suspicious findings  Thyroid US (1/19/18)- no suspicious findings  Denies family history of thyroid cancer. She's been diagnosed with osteopenia in 2007, on Fosamax initially (with a GI distress), then switched to Boniva and stayed on it for 2 years. DXA (10/5/21)- LS (-1.1), FN (-1.4). FRAX- 8.2% and 0.7% DXA (8/6/19)- LS (-0.6), FN (-1.2)

## 2024-09-13 ENCOUNTER — APPOINTMENT (OUTPATIENT)
Dept: ENDOCRINOLOGY | Facility: CLINIC | Age: 64
End: 2024-09-13

## 2024-10-14 ENCOUNTER — APPOINTMENT (OUTPATIENT)
Dept: ENDOCRINOLOGY | Facility: CLINIC | Age: 64
End: 2024-10-14
Payer: MEDICARE

## 2024-10-14 VITALS
HEIGHT: 61 IN | TEMPERATURE: 98.7 F | SYSTOLIC BLOOD PRESSURE: 120 MMHG | HEART RATE: 68 BPM | BODY MASS INDEX: 27 KG/M2 | WEIGHT: 143 LBS | RESPIRATION RATE: 16 BRPM | OXYGEN SATURATION: 98 % | DIASTOLIC BLOOD PRESSURE: 72 MMHG

## 2024-10-14 DIAGNOSIS — M81.0 AGE-RELATED OSTEOPOROSIS W/OUT CURRENT PATHOLOGICAL FRACTURE: ICD-10-CM

## 2024-10-14 DIAGNOSIS — C73 MALIGNANT NEOPLASM OF THYROID GLAND: ICD-10-CM

## 2024-10-14 PROCEDURE — 96372 THER/PROPH/DIAG INJ SC/IM: CPT

## 2024-10-14 PROCEDURE — 99213 OFFICE O/P EST LOW 20 MIN: CPT | Mod: 25

## 2024-10-14 RX ORDER — ROMOSOZUMAB-AQQG 105 MG/1.17ML
105 INJECTION, SOLUTION SUBCUTANEOUS
Refills: 0 | Status: COMPLETED | OUTPATIENT
Start: 2024-10-14

## 2024-10-14 RX ADMIN — ROMOSOZUMAB-AQQG 0 MG/1.17ML: 105 INJECTION, SOLUTION SUBCUTANEOUS at 00:00

## 2024-11-18 ENCOUNTER — APPOINTMENT (OUTPATIENT)
Dept: ENDOCRINOLOGY | Facility: CLINIC | Age: 64
End: 2024-11-18
Payer: MEDICARE

## 2024-11-18 ENCOUNTER — TRANSCRIPTION ENCOUNTER (OUTPATIENT)
Age: 64
End: 2024-11-18

## 2024-11-18 VITALS
HEART RATE: 67 BPM | RESPIRATION RATE: 16 BRPM | OXYGEN SATURATION: 99 % | DIASTOLIC BLOOD PRESSURE: 71 MMHG | WEIGHT: 141 LBS | SYSTOLIC BLOOD PRESSURE: 106 MMHG | BODY MASS INDEX: 26.62 KG/M2 | HEIGHT: 61 IN | TEMPERATURE: 97.6 F

## 2024-11-18 DIAGNOSIS — C73 MALIGNANT NEOPLASM OF THYROID GLAND: ICD-10-CM

## 2024-11-18 DIAGNOSIS — E87.5 HYPERKALEMIA: ICD-10-CM

## 2024-11-18 DIAGNOSIS — M81.0 AGE-RELATED OSTEOPOROSIS W/OUT CURRENT PATHOLOGICAL FRACTURE: ICD-10-CM

## 2024-11-18 DIAGNOSIS — E89.0 POSTPROCEDURAL HYPOTHYROIDISM: ICD-10-CM

## 2024-11-18 LAB
25(OH)D3 SERPL-MCNC: 33.3 NG/ML
ALBUMIN SERPL ELPH-MCNC: 4.6 G/DL
ALP BLD-CCNC: 82 U/L
ALT SERPL-CCNC: 13 U/L
ANION GAP SERPL CALC-SCNC: 12 MMOL/L
AST SERPL-CCNC: 18 U/L
BASOPHILS # BLD AUTO: 0.04 K/UL
BASOPHILS NFR BLD AUTO: 0.7 %
BILIRUB SERPL-MCNC: 0.8 MG/DL
BUN SERPL-MCNC: 17 MG/DL
CALCIUM SERPL-MCNC: 9.9 MG/DL
CHLORIDE SERPL-SCNC: 103 MMOL/L
CHOLEST SERPL-MCNC: 248 MG/DL
CO2 SERPL-SCNC: 26 MMOL/L
CREAT SERPL-MCNC: 0.72 MG/DL
EGFR: 93 ML/MIN/1.73M2
EOSINOPHIL # BLD AUTO: 0.28 K/UL
EOSINOPHIL NFR BLD AUTO: 4.7 %
ESTIMATED AVERAGE GLUCOSE: 105 MG/DL
GLUCOSE SERPL-MCNC: 77 MG/DL
HBA1C MFR BLD HPLC: 5.3 %
HCT VFR BLD CALC: 43.6 %
HDLC SERPL-MCNC: 114 MG/DL
HGB BLD-MCNC: 14.4 G/DL
IMM GRANULOCYTES NFR BLD AUTO: 0.2 %
LDLC SERPL CALC-MCNC: 129 MG/DL
LYMPHOCYTES # BLD AUTO: 1.4 K/UL
LYMPHOCYTES NFR BLD AUTO: 23.6 %
MAN DIFF?: NORMAL
MCHC RBC-ENTMCNC: 30 PG
MCHC RBC-ENTMCNC: 33 G/DL
MCV RBC AUTO: 90.8 FL
MONOCYTES # BLD AUTO: 0.37 K/UL
MONOCYTES NFR BLD AUTO: 6.3 %
NEUTROPHILS # BLD AUTO: 3.82 K/UL
NEUTROPHILS NFR BLD AUTO: 64.5 %
NONHDLC SERPL-MCNC: 134 MG/DL
PLATELET # BLD AUTO: 229 K/UL
POTASSIUM SERPL-SCNC: 5.2 MMOL/L
POTASSIUM SERPL-SCNC: 6 MMOL/L
PROT SERPL-MCNC: 6.8 G/DL
RBC # BLD: 4.8 M/UL
RBC # FLD: 13.7 %
SODIUM SERPL-SCNC: 141 MMOL/L
T3 SERPL-MCNC: 87 NG/DL
T3FREE SERPL-MCNC: 2.79 PG/ML
T4 FREE SERPL-MCNC: 1.6 NG/DL
THYROGLOB AB SERPL-ACNC: 16 IU/ML
THYROGLOB SERPL-MCNC: <0.1 NG/ML
TRIGL SERPL-MCNC: 35 MG/DL
TSH SERPL-ACNC: 1.05 UIU/ML
WBC # FLD AUTO: 5.92 K/UL

## 2024-11-18 PROCEDURE — 99214 OFFICE O/P EST MOD 30 MIN: CPT | Mod: 25

## 2024-11-18 PROCEDURE — 36415 COLL VENOUS BLD VENIPUNCTURE: CPT

## 2024-11-18 PROCEDURE — ZZZZZ: CPT

## 2024-11-18 PROCEDURE — 96372 THER/PROPH/DIAG INJ SC/IM: CPT

## 2024-11-18 RX ORDER — ROMOSOZUMAB-AQQG 105 MG/1.17ML
105 INJECTION, SOLUTION SUBCUTANEOUS
Refills: 0 | Status: COMPLETED | OUTPATIENT
Start: 2024-11-18

## 2024-11-18 RX ADMIN — ROMOSOZUMAB-AQQG 0 MG/1.17ML: 105 INJECTION, SOLUTION SUBCUTANEOUS at 00:00

## 2024-12-18 ENCOUNTER — APPOINTMENT (OUTPATIENT)
Dept: ENDOCRINOLOGY | Facility: CLINIC | Age: 64
End: 2024-12-18
Payer: MEDICARE

## 2024-12-18 VITALS
DIASTOLIC BLOOD PRESSURE: 66 MMHG | HEART RATE: 80 BPM | TEMPERATURE: 97.9 F | OXYGEN SATURATION: 96 % | SYSTOLIC BLOOD PRESSURE: 112 MMHG | WEIGHT: 139 LBS | BODY MASS INDEX: 26.24 KG/M2 | RESPIRATION RATE: 16 BRPM | HEIGHT: 61 IN

## 2024-12-18 DIAGNOSIS — E89.0 POSTPROCEDURAL HYPOTHYROIDISM: ICD-10-CM

## 2024-12-18 DIAGNOSIS — E87.5 HYPERKALEMIA: ICD-10-CM

## 2024-12-18 DIAGNOSIS — M81.0 AGE-RELATED OSTEOPOROSIS W/OUT CURRENT PATHOLOGICAL FRACTURE: ICD-10-CM

## 2024-12-18 DIAGNOSIS — C73 MALIGNANT NEOPLASM OF THYROID GLAND: ICD-10-CM

## 2024-12-18 LAB
ANION GAP SERPL CALC-SCNC: 13 MMOL/L
BUN SERPL-MCNC: 18 MG/DL
CALCIUM SERPL-MCNC: 9.3 MG/DL
CHLORIDE SERPL-SCNC: 105 MMOL/L
CO2 SERPL-SCNC: 24 MMOL/L
CREAT SERPL-MCNC: 0.71 MG/DL
EGFR: 94 ML/MIN/1.73M2
GLUCOSE SERPL-MCNC: 78 MG/DL
POTASSIUM SERPL-SCNC: 4.9 MMOL/L
POTASSIUM SERPL-SCNC: 5 MMOL/L
SODIUM SERPL-SCNC: 141 MMOL/L

## 2024-12-18 PROCEDURE — 99213 OFFICE O/P EST LOW 20 MIN: CPT | Mod: 25

## 2024-12-18 PROCEDURE — 96372 THER/PROPH/DIAG INJ SC/IM: CPT

## 2024-12-18 PROCEDURE — 36415 COLL VENOUS BLD VENIPUNCTURE: CPT

## 2024-12-18 RX ORDER — ROMOSOZUMAB-AQQG 105 MG/1.17ML
105 INJECTION, SOLUTION SUBCUTANEOUS
Refills: 0 | Status: COMPLETED | OUTPATIENT
Start: 2024-12-18

## 2024-12-18 RX ADMIN — ROMOSOZUMAB-AQQG 0 MG/1.17ML: 105 INJECTION, SOLUTION SUBCUTANEOUS at 00:00

## 2025-01-16 ENCOUNTER — APPOINTMENT (OUTPATIENT)
Dept: ENDOCRINOLOGY | Facility: CLINIC | Age: 65
End: 2025-01-16

## 2025-01-23 ENCOUNTER — TRANSCRIPTION ENCOUNTER (OUTPATIENT)
Age: 65
End: 2025-01-23

## 2025-01-23 ENCOUNTER — APPOINTMENT (OUTPATIENT)
Dept: ENDOCRINOLOGY | Facility: CLINIC | Age: 65
End: 2025-01-23
Payer: MEDICARE

## 2025-01-23 DIAGNOSIS — M81.0 AGE-RELATED OSTEOPOROSIS W/OUT CURRENT PATHOLOGICAL FRACTURE: ICD-10-CM

## 2025-01-23 PROCEDURE — 96372 THER/PROPH/DIAG INJ SC/IM: CPT

## 2025-01-23 RX ORDER — ROMOSOZUMAB-AQQG 105 MG/1.17ML
105 INJECTION, SOLUTION SUBCUTANEOUS
Refills: 0 | Status: COMPLETED | OUTPATIENT
Start: 2025-01-23

## 2025-01-23 RX ADMIN — ROMOSOZUMAB-AQQG 0 MG/1.17ML: 105 INJECTION, SOLUTION SUBCUTANEOUS at 00:00

## 2025-02-18 ENCOUNTER — APPOINTMENT (OUTPATIENT)
Dept: ENDOCRINOLOGY | Facility: CLINIC | Age: 65
End: 2025-02-18
Payer: MEDICARE

## 2025-02-18 VITALS
TEMPERATURE: 97.5 F | RESPIRATION RATE: 16 BRPM | OXYGEN SATURATION: 97 % | HEIGHT: 61 IN | DIASTOLIC BLOOD PRESSURE: 71 MMHG | BODY MASS INDEX: 26.43 KG/M2 | WEIGHT: 140 LBS | HEART RATE: 75 BPM | SYSTOLIC BLOOD PRESSURE: 110 MMHG

## 2025-02-18 DIAGNOSIS — M81.0 AGE-RELATED OSTEOPOROSIS W/OUT CURRENT PATHOLOGICAL FRACTURE: ICD-10-CM

## 2025-02-18 DIAGNOSIS — C73 MALIGNANT NEOPLASM OF THYROID GLAND: ICD-10-CM

## 2025-02-18 DIAGNOSIS — E89.0 POSTPROCEDURAL HYPOTHYROIDISM: ICD-10-CM

## 2025-02-18 PROCEDURE — 99213 OFFICE O/P EST LOW 20 MIN: CPT | Mod: 25

## 2025-02-18 PROCEDURE — 96372 THER/PROPH/DIAG INJ SC/IM: CPT

## 2025-02-18 RX ORDER — ROMOSOZUMAB-AQQG 105 MG/1.17ML
105 INJECTION, SOLUTION SUBCUTANEOUS
Refills: 0 | Status: COMPLETED | OUTPATIENT
Start: 2025-02-18

## 2025-02-18 RX ADMIN — ROMOSOZUMAB-AQQG 0 MG/1.17ML: 105 INJECTION, SOLUTION SUBCUTANEOUS at 00:00

## 2025-03-17 ENCOUNTER — APPOINTMENT (OUTPATIENT)
Dept: ENDOCRINOLOGY | Facility: CLINIC | Age: 65
End: 2025-03-17
Payer: MEDICARE

## 2025-03-17 VITALS
WEIGHT: 142 LBS | HEIGHT: 61 IN | HEART RATE: 74 BPM | RESPIRATION RATE: 16 BRPM | DIASTOLIC BLOOD PRESSURE: 75 MMHG | OXYGEN SATURATION: 96 % | BODY MASS INDEX: 26.81 KG/M2 | SYSTOLIC BLOOD PRESSURE: 109 MMHG

## 2025-03-17 DIAGNOSIS — E87.5 HYPERKALEMIA: ICD-10-CM

## 2025-03-17 DIAGNOSIS — C73 MALIGNANT NEOPLASM OF THYROID GLAND: ICD-10-CM

## 2025-03-17 DIAGNOSIS — E89.0 POSTPROCEDURAL HYPOTHYROIDISM: ICD-10-CM

## 2025-03-17 DIAGNOSIS — M81.0 AGE-RELATED OSTEOPOROSIS W/OUT CURRENT PATHOLOGICAL FRACTURE: ICD-10-CM

## 2025-03-17 PROCEDURE — 99213 OFFICE O/P EST LOW 20 MIN: CPT | Mod: 25

## 2025-03-17 PROCEDURE — 36415 COLL VENOUS BLD VENIPUNCTURE: CPT

## 2025-03-17 PROCEDURE — 96372 THER/PROPH/DIAG INJ SC/IM: CPT

## 2025-03-17 RX ORDER — ROMOSOZUMAB-AQQG 105 MG/1.17ML
105 INJECTION, SOLUTION SUBCUTANEOUS
Refills: 0 | Status: COMPLETED | OUTPATIENT
Start: 2025-03-17

## 2025-03-17 RX ADMIN — ROMOSOZUMAB-AQQG 0 MG/1.17ML: 105 INJECTION, SOLUTION SUBCUTANEOUS at 00:00

## 2025-03-18 ENCOUNTER — TRANSCRIPTION ENCOUNTER (OUTPATIENT)
Age: 65
End: 2025-03-18

## 2025-03-18 LAB
25(OH)D3 SERPL-MCNC: 33.9 NG/ML
ALBUMIN SERPL ELPH-MCNC: 4.5 G/DL
ALP BLD-CCNC: 86 U/L
ALT SERPL-CCNC: 16 U/L
ANION GAP SERPL CALC-SCNC: 11 MMOL/L
AST SERPL-CCNC: 18 U/L
BASOPHILS # BLD AUTO: 0.07 K/UL
BASOPHILS NFR BLD AUTO: 1.3 %
BILIRUB SERPL-MCNC: 0.4 MG/DL
BUN SERPL-MCNC: 20 MG/DL
CALCIUM SERPL-MCNC: 9.6 MG/DL
CHLORIDE SERPL-SCNC: 106 MMOL/L
CHOLEST SERPL-MCNC: 239 MG/DL
CO2 SERPL-SCNC: 25 MMOL/L
CREAT SERPL-MCNC: 0.68 MG/DL
EGFRCR SERPLBLD CKD-EPI 2021: 97 ML/MIN/1.73M2
EOSINOPHIL # BLD AUTO: 0.36 K/UL
EOSINOPHIL NFR BLD AUTO: 6.6 %
ESTIMATED AVERAGE GLUCOSE: 103 MG/DL
GLUCOSE SERPL-MCNC: 89 MG/DL
HBA1C MFR BLD HPLC: 5.2 %
HCT VFR BLD CALC: 43.8 %
HDLC SERPL-MCNC: 119 MG/DL
HGB BLD-MCNC: 14.7 G/DL
IMM GRANULOCYTES NFR BLD AUTO: 0 %
LDLC SERPL-MCNC: 115 MG/DL
LYMPHOCYTES # BLD AUTO: 1.48 K/UL
LYMPHOCYTES NFR BLD AUTO: 27.2 %
MAN DIFF?: NORMAL
MCHC RBC-ENTMCNC: 30.9 PG
MCHC RBC-ENTMCNC: 33.6 G/DL
MCV RBC AUTO: 92.2 FL
MONOCYTES # BLD AUTO: 0.32 K/UL
MONOCYTES NFR BLD AUTO: 5.9 %
NEUTROPHILS # BLD AUTO: 3.21 K/UL
NEUTROPHILS NFR BLD AUTO: 59 %
NONHDLC SERPL-MCNC: 120 MG/DL
PLATELET # BLD AUTO: 222 K/UL
POTASSIUM SERPL-SCNC: 5.1 MMOL/L
POTASSIUM SERPL-SCNC: 5.3 MMOL/L
PROT SERPL-MCNC: 6.5 G/DL
RBC # BLD: 4.75 M/UL
RBC # FLD: 13.7 %
SODIUM SERPL-SCNC: 142 MMOL/L
T3 SERPL-MCNC: 82 NG/DL
T4 FREE SERPL-MCNC: 1.4 NG/DL
THYROGLOB AB SERPL-ACNC: 15.2 IU/ML
THYROGLOB SERPL-MCNC: <0.1 NG/ML
TRIGL SERPL-MCNC: 30 MG/DL
TSH SERPL-ACNC: 1.33 UIU/ML
WBC # FLD AUTO: 5.44 K/UL

## 2025-03-26 ENCOUNTER — TRANSCRIPTION ENCOUNTER (OUTPATIENT)
Age: 65
End: 2025-03-26

## 2025-03-31 ENCOUNTER — TRANSCRIPTION ENCOUNTER (OUTPATIENT)
Age: 65
End: 2025-03-31

## 2025-04-07 ENCOUNTER — TRANSCRIPTION ENCOUNTER (OUTPATIENT)
Age: 65
End: 2025-04-07

## 2025-04-17 ENCOUNTER — NON-APPOINTMENT (OUTPATIENT)
Age: 65
End: 2025-04-17

## 2025-04-21 ENCOUNTER — APPOINTMENT (OUTPATIENT)
Dept: ENDOCRINOLOGY | Facility: CLINIC | Age: 65
End: 2025-04-21
Payer: MEDICARE

## 2025-04-21 VITALS
HEART RATE: 80 BPM | SYSTOLIC BLOOD PRESSURE: 123 MMHG | WEIGHT: 144 LBS | OXYGEN SATURATION: 97 % | HEIGHT: 61 IN | RESPIRATION RATE: 16 BRPM | BODY MASS INDEX: 27.19 KG/M2 | TEMPERATURE: 97.7 F | DIASTOLIC BLOOD PRESSURE: 76 MMHG

## 2025-04-21 DIAGNOSIS — M81.0 AGE-RELATED OSTEOPOROSIS W/OUT CURRENT PATHOLOGICAL FRACTURE: ICD-10-CM

## 2025-04-21 DIAGNOSIS — E89.0 POSTPROCEDURAL HYPOTHYROIDISM: ICD-10-CM

## 2025-04-21 DIAGNOSIS — C73 MALIGNANT NEOPLASM OF THYROID GLAND: ICD-10-CM

## 2025-04-21 PROCEDURE — 99213 OFFICE O/P EST LOW 20 MIN: CPT | Mod: 25

## 2025-04-21 PROCEDURE — 96372 THER/PROPH/DIAG INJ SC/IM: CPT

## 2025-04-21 RX ORDER — ROMOSOZUMAB-AQQG 105 MG/1.17ML
105 INJECTION, SOLUTION SUBCUTANEOUS
Refills: 0 | Status: COMPLETED | OUTPATIENT
Start: 2025-04-21

## 2025-04-21 RX ADMIN — ROMOSOZUMAB-AQQG 0 MG/1.17ML: 105 INJECTION, SOLUTION SUBCUTANEOUS at 00:00

## 2025-04-23 ENCOUNTER — TRANSCRIPTION ENCOUNTER (OUTPATIENT)
Age: 65
End: 2025-04-23

## 2025-05-22 ENCOUNTER — APPOINTMENT (OUTPATIENT)
Dept: RADIOLOGY | Facility: CLINIC | Age: 65
End: 2025-05-22
Payer: MEDICARE

## 2025-05-22 ENCOUNTER — OUTPATIENT (OUTPATIENT)
Dept: OUTPATIENT SERVICES | Facility: HOSPITAL | Age: 65
LOS: 1 days | End: 2025-05-22
Payer: MEDICARE

## 2025-05-22 DIAGNOSIS — E89.0 POSTPROCEDURAL HYPOTHYROIDISM: ICD-10-CM

## 2025-05-22 DIAGNOSIS — C73 MALIGNANT NEOPLASM OF THYROID GLAND: ICD-10-CM

## 2025-05-22 PROCEDURE — 77080 DXA BONE DENSITY AXIAL: CPT | Mod: 26

## 2025-05-22 PROCEDURE — 77080 DXA BONE DENSITY AXIAL: CPT

## 2025-05-27 RX ORDER — ZOLEDRONIC ACID 5 MG/100ML
5 INJECTION INTRAVENOUS
Refills: 0 | Status: ACTIVE | OUTPATIENT
Start: 2025-05-27

## 2025-05-27 RX ORDER — ZOLEDRONIC ACID 5 MG/100ML
5 INJECTION INTRAVENOUS
Qty: 1 | Refills: 0 | Status: ACTIVE | OUTPATIENT
Start: 2025-05-27

## 2025-06-12 ENCOUNTER — TRANSCRIPTION ENCOUNTER (OUTPATIENT)
Age: 65
End: 2025-06-12

## 2025-06-13 ENCOUNTER — TRANSCRIPTION ENCOUNTER (OUTPATIENT)
Age: 65
End: 2025-06-13

## 2025-07-02 ENCOUNTER — RX RENEWAL (OUTPATIENT)
Age: 65
End: 2025-07-02

## 2025-07-07 ENCOUNTER — TRANSCRIPTION ENCOUNTER (OUTPATIENT)
Age: 65
End: 2025-07-07

## 2025-07-08 ENCOUNTER — TRANSCRIPTION ENCOUNTER (OUTPATIENT)
Age: 65
End: 2025-07-08

## 2025-07-28 ENCOUNTER — APPOINTMENT (OUTPATIENT)
Dept: RHEUMATOLOGY | Facility: CLINIC | Age: 65
End: 2025-07-28
Payer: MEDICARE

## 2025-07-28 VITALS — DIASTOLIC BLOOD PRESSURE: 66 MMHG | SYSTOLIC BLOOD PRESSURE: 101 MMHG | HEART RATE: 69 BPM | OXYGEN SATURATION: 96 %

## 2025-07-28 VITALS
HEART RATE: 68 BPM | SYSTOLIC BLOOD PRESSURE: 124 MMHG | OXYGEN SATURATION: 99 % | DIASTOLIC BLOOD PRESSURE: 71 MMHG | TEMPERATURE: 98 F | RESPIRATION RATE: 16 BRPM

## 2025-07-28 PROCEDURE — 96365 THER/PROPH/DIAG IV INF INIT: CPT

## 2025-08-13 ENCOUNTER — TRANSCRIPTION ENCOUNTER (OUTPATIENT)
Age: 65
End: 2025-08-13

## 2025-09-10 ENCOUNTER — APPOINTMENT (OUTPATIENT)
Dept: ENDOCRINOLOGY | Facility: CLINIC | Age: 65
End: 2025-09-10
Payer: MEDICARE

## 2025-09-10 VITALS
DIASTOLIC BLOOD PRESSURE: 74 MMHG | BODY MASS INDEX: 26.62 KG/M2 | HEART RATE: 67 BPM | OXYGEN SATURATION: 96 % | HEIGHT: 61 IN | RESPIRATION RATE: 16 BRPM | SYSTOLIC BLOOD PRESSURE: 109 MMHG | WEIGHT: 141 LBS

## 2025-09-10 DIAGNOSIS — C73 MALIGNANT NEOPLASM OF THYROID GLAND: ICD-10-CM

## 2025-09-10 DIAGNOSIS — M81.0 AGE-RELATED OSTEOPOROSIS W/OUT CURRENT PATHOLOGICAL FRACTURE: ICD-10-CM

## 2025-09-10 DIAGNOSIS — E89.0 POSTPROCEDURAL HYPOTHYROIDISM: ICD-10-CM

## 2025-09-10 DIAGNOSIS — E87.5 HYPERKALEMIA: ICD-10-CM

## 2025-09-10 PROCEDURE — 99214 OFFICE O/P EST MOD 30 MIN: CPT

## 2025-09-10 PROCEDURE — G2211 COMPLEX E/M VISIT ADD ON: CPT

## 2025-09-10 PROCEDURE — 36415 COLL VENOUS BLD VENIPUNCTURE: CPT

## 2025-09-12 ENCOUNTER — TRANSCRIPTION ENCOUNTER (OUTPATIENT)
Age: 65
End: 2025-09-12

## 2025-09-12 LAB
25(OH)D3 SERPL-MCNC: 40.7 NG/ML
ALBUMIN SERPL ELPH-MCNC: 4.4 G/DL
ALP BLD-CCNC: 67 U/L
ALT SERPL-CCNC: 21 U/L
ANION GAP SERPL CALC-SCNC: 12 MMOL/L
AST SERPL-CCNC: 23 U/L
BILIRUB SERPL-MCNC: 0.6 MG/DL
BUN SERPL-MCNC: 18 MG/DL
CALCIUM SERPL-MCNC: 9.3 MG/DL
CHLORIDE SERPL-SCNC: 105 MMOL/L
CO2 SERPL-SCNC: 22 MMOL/L
CREAT SERPL-MCNC: 0.68 MG/DL
EGFRCR SERPLBLD CKD-EPI 2021: 96 ML/MIN/1.73M2
GLUCOSE SERPL-MCNC: 87 MG/DL
POTASSIUM SERPL-SCNC: 5.1 MMOL/L
POTASSIUM SERPL-SCNC: 5.4 MMOL/L
PROT SERPL-MCNC: 6.6 G/DL
SODIUM SERPL-SCNC: 140 MMOL/L
T3FREE SERPL-MCNC: 2.3 PG/ML
T4 FREE SERPL-MCNC: 1.3 NG/DL
THYROGLOB AB SERPL-ACNC: 19 IU/ML
THYROGLOB SERPL-MCNC: <0.1 NG/ML
TSH SERPL-ACNC: 1.44 UIU/ML